# Patient Record
Sex: MALE | Race: WHITE | URBAN - METROPOLITAN AREA
[De-identification: names, ages, dates, MRNs, and addresses within clinical notes are randomized per-mention and may not be internally consistent; named-entity substitution may affect disease eponyms.]

---

## 2017-03-03 ENCOUNTER — TRANSFERRED RECORDS (OUTPATIENT)
Dept: HEALTH INFORMATION MANAGEMENT | Facility: CLINIC | Age: 47
End: 2017-03-03

## 2017-03-21 ENCOUNTER — TRANSFERRED RECORDS (OUTPATIENT)
Dept: HEALTH INFORMATION MANAGEMENT | Facility: CLINIC | Age: 47
End: 2017-03-21

## 2017-04-11 ENCOUNTER — MEDICAL CORRESPONDENCE (OUTPATIENT)
Dept: HEALTH INFORMATION MANAGEMENT | Facility: CLINIC | Age: 47
End: 2017-04-11

## 2017-04-11 ENCOUNTER — TRANSFERRED RECORDS (OUTPATIENT)
Dept: HEALTH INFORMATION MANAGEMENT | Facility: CLINIC | Age: 47
End: 2017-04-11

## 2017-04-19 ENCOUNTER — PRE VISIT (OUTPATIENT)
Dept: NEUROLOGY | Facility: CLINIC | Age: 47
End: 2017-04-19

## 2017-04-19 NOTE — TELEPHONE ENCOUNTER
1.  Date/reason for appt: 5/2/17 at 8:30 AM - Headaches  2.  Referring provider: Dr. Alfred miguel/ [facility]  3.  Call to patient (Yes / No - short description): No, referred  4.  Previous care at:   - [facility]

## 2017-05-01 ASSESSMENT — ENCOUNTER SYMPTOMS
COUGH: 1
WEAKNESS: 0
PARALYSIS: 0
SHORTNESS OF BREATH: 0
EYE PAIN: 1
MYALGIAS: 1
SPUTUM PRODUCTION: 1
TREMORS: 0
MUSCLE CRAMPS: 0
SINUS CONGESTION: 0
DYSPNEA ON EXERTION: 0
RESPIRATORY PAIN: 0
FEVER: 1
SPEECH CHANGE: 0
TINGLING: 0
EYE IRRITATION: 0
CHILLS: 1
NECK MASS: 0
SORE THROAT: 0
JOINT SWELLING: 0
MUSCLE WEAKNESS: 0
DIZZINESS: 0
SEIZURES: 0
HEMOPTYSIS: 0
BACK PAIN: 0
EYE WATERING: 0
LOSS OF CONSCIOUSNESS: 0
EYE REDNESS: 0
NECK PAIN: 0
NUMBNESS: 0
FATIGUE: 1
SMELL DISTURBANCE: 0
STIFFNESS: 1
POSTURAL DYSPNEA: 0
ARTHRALGIAS: 1
SINUS PAIN: 1
HEADACHES: 1
MEMORY LOSS: 0
HOARSE VOICE: 0
SNORES LOUDLY: 0
DISTURBANCES IN COORDINATION: 0
DOUBLE VISION: 0
WHEEZING: 0
TROUBLE SWALLOWING: 0
COUGH DISTURBING SLEEP: 1

## 2017-05-02 ENCOUNTER — OFFICE VISIT (OUTPATIENT)
Dept: NEUROLOGY | Facility: CLINIC | Age: 47
End: 2017-05-02

## 2017-05-02 VITALS — HEART RATE: 81 BPM | HEIGHT: 69 IN | DIASTOLIC BLOOD PRESSURE: 90 MMHG | SYSTOLIC BLOOD PRESSURE: 138 MMHG

## 2017-05-02 DIAGNOSIS — R03.0 ELEVATED BLOOD PRESSURE READING WITHOUT DIAGNOSIS OF HYPERTENSION: ICD-10-CM

## 2017-05-02 DIAGNOSIS — R51.9 FRONTAL HEADACHE: Primary | ICD-10-CM

## 2017-05-02 RX ORDER — PROPRANOLOL HCL 60 MG
60 CAPSULE, EXTENDED RELEASE 24HR ORAL DAILY
Qty: 30 CAPSULE | Refills: 3 | Status: SHIPPED | OUTPATIENT
Start: 2017-05-02 | End: 2017-05-02

## 2017-05-02 RX ORDER — AMITRIPTYLINE HYDROCHLORIDE 10 MG/1
10 TABLET ORAL AT BEDTIME
Qty: 30 TABLET | Refills: 1 | Status: SHIPPED | OUTPATIENT
Start: 2017-05-02

## 2017-05-02 ASSESSMENT — PAIN SCALES - GENERAL: PAINLEVEL: EXTREME PAIN (8)

## 2017-05-02 NOTE — LETTER
5/2/2017       RE: Elvis Perales  [id number] [address] [id number]     Dear Colleague,    Thank you for referring your patient, Elvis Perales, to the [facility] NEUROLOGY at [facility]. Please see a copy of my visit note below.    Re: Elvis Perales      MRN# [id number]  YOB: 1970  Date of Visit:5/2/2017     OUTPATIENT NEUROLOGY VISIT NOTE  Chief Complaint:  Headache evaluation    History of Present Illness  Elvis Perales is a 46-year-old male presents to the clinic today for frontal headache evaluation.   Accompanied by his wife of 27 years.     Headache History:    Onset History: Daily since December of 2016. Patient reports that he was treated by ENT (chronic sinusitis) and rheumatologist ([facility] for the arthritis), took prednisone and azithromycin nothing helped in the long term. Reports that his headaches would be associated with sinus problems. Mother-has chronic sinusitis and headaches associated with sinusitis.      Current Headache Pattern:      Frequency (How many headache days per month?): Daily     Duration of Headache:  Headache improves with ibuprofen or tylenol and reoccur 4-6 hours.    Aura: none   Associated Symptoms:  Chills/cold and fatigue, tired,  Not so much of lightsensitivity     Description of Headache Pain & Location:  Pressure pain between his eyes        Level of Pain during usual headache:  7-8/10      Level of Pain during the worst headache:  7-8/10    Do headaches interfere with or prevent usual activities or diminish your productivity at home or work?  Yes     Treatments Tried:  Advil helps the best or Tylenol daily every 4-6 hours  Have you needed to utilize the Emergency Room to treat your headache symptoms?  If so, how often and when was the last time used:  no  Are Headaches worsening over time?  No    What makes your headaches better?  NSAIDs and Tylenol, sinus medications    What makes your headaches worse  or triggers your headaches? no     Sleeping is Ok for the most part  Coughing in am due to post nasal drip, headache starts after coughing starts    Denies history of head or neck trauma, dizziness, vertigo, loss of consciousness, seizure, double vision, blurred vision, hearing difficulty, speech or swallowing difficulty, weakness or numbness in face, arms or legs, urinary or bowel incontinence, coordination problems or gait difficulty, fever or chills.    Neurodiagnostic Testing  [facility] MEDICAL IMAGING  CT SINUS WO  3/3/2017 10:40 AM    INDICATION: Post-nasal Drainage Chronic Sinusitis, Unspecified Location Persistent Headaches Sinus Pressure  TECHNIQUE: Direct thin section axial CT with coronal and sagittal reconstruction. Dose reduction techniques were used.  COMPARISON: None.    FINDINGS:   POSTOPERATIVE CHANGES: None apparent.    FRONTAL SINUSES:  Clear.    ETHMOID SINUSES:  Clear.    SPHENOID SINUSES: Clear.     MAXILLARY SINUSES: Moderate sized polyp or retention cyst in the right maxillary sinus. Minimal membrane thickening left maxillary sinus. The floors of the maxillary sinuses are intact.    NASAL CAVITY/SKULL BASE: Midline nasal septum without focal defect. Unremarkable nasal turbinates. The cribriform plate is intact and symmetric. The anterior clinoid processes are not pneumatized.    PARANASAL SINUS DRAINAGE PATHWAYS:  The paranasal sinus drainage pathways are widely patent.    NON-SINUS STRUCTURES: No intraorbital mass. The visualized intracranial contents and mastoids are unremarkable. Heterogeneous clivus and sphenoid bone raises the possibility of fibrous dysplasia.    CONCLUSION:  1.  Moderate-sized polyp or retention cysts in the right maxillary sinus.  2.  No evidence for acute or chronic sinusitis.  Lab -CRP, ESR, P-ANCA, C-ANCA,   Presumed normal CBC, ÁNGEL, Lupus, hep C and B per patient was completed by his rheumatologist but no actual report available today for review.  "  Past Medical History reviewed and verified with the patient  Chronic sinusitis  Sore joints associated with sinus problems and headaches  Chews tobacco  Gastric reflux and takes omeprazole.   Rotator cuff tear and surgery    Past Surgical History reviewed and verified with the patient  Hernia repair  Tonsillectomy  Vasectomy  Rotator cuff repair in 2011  right shoulder s/p arthroscopic SAD, AC joint resection, RCR, with mini-open biceps tenodesis 03/09/2011   Cataract bilateral in March of 2016    Family History reviewed and verified with the patient  No neurological problems   reviewed and verified with the patient     Social History     Social History     Marital status:  for 27 years     Spouse name:      Number of children: 2       Occupational History     Work as a  for 18 years     Social History Main Topics     Smoking status: Current Every Day Smoker     Types: Dip, chew, snus or snuff     Alcohol use No to occasionally/rare     Drug use: No     Other Topics Concern     Allergies   Allergen Reactions     Ciprofloxacin      Codeine Hives     Minocycline Hives     Penicillins Hives     Sulfa Drugs Nausea and Vomiting       No current outpatient prescriptions on file.   reviewed and verified with the patient    Review of Systems:  A 12-point ROS including constitutional, eyes, ENT, respiratory, cardiovascular, gastroenterology, genitourinary, integumentary, musculoskeletal, neurology, hematology and psychiatric were all reviewed with the patient and completed at the [facility] Question nary and as mentioned in the HPI.     General Exam:   /90  Pulse 81  Ht 1.753 m (5' 9\")  Manually rechecked /90   GEN: Awake, NAD; good eye contact, responses appropriately   HEENT: Head atraumatic/Normocephalic. Scalp normal. Supratrochlear/supraorbital tenderness, right>left. Pupils equally round, 2 mm, reactive to light and accommodation, sclera and conjunctiva normal. Fundoscopic " examination attempted  Neck: Easily moveable without resistance  Heart: S1/S2 appreciated, RRR, no m/r/g, no carotid bruits  Lungs:Lungs are clear to auscultation bilaterally, no wheezes or crackles.   Neurological Examination:  The patient is alert and oriented times four. Has good attention and concentration. Speech is fluent without dysarthria.   Cranial nerves:  CN I deferred.   CN II: Intact and full visual fields to confrontation bilaterally. CN III, IV, VI: EOM intact. There is no nystagmus. Has conjugated gaze. Intact direct and consensual pupillary light reflexes.   CN V: Intact and symmetrical to facial sensation in the V1 through V3 bilaterally.   CN VII: Intact and symmetrical eyebrow and lid raise and eyelid closure, smiles and frown.   CN VIII: Intact to finger rub bilaterally.   CN IX and X: The palates elevates symmetrical. The uvula is midline.   CN XII: The tongue protrudes midline with no atrophy or fasciculations.   Motor exam: The patient has a normal bulk and tone throughout. There is no atrophy, fasciculations, clonus, or abnormal movements appreciated.   Strength Exam:  5/5 strength at shoulder abduction, elbow flexion or extension, wrist flexion or extension, finger abduction, , hip flexion and extension, knee flexion and extension, and dorsiflexion and plantarflexion bilaterally.   Sensation is intact to light touch and pinprick throughout. Has good vibration and proprioception sensation at great toes bilaterally.   Reflexes are 2/4 and symmetrical at biceps, triceps, brachioradialis, patellar, and Achilles.   Negative Babinski with downgoing toes bilaterally.   Coordination reveals finger-nose-finger with normal speed and accuracy.   Station and gait is normal.     Assessment and Plan:  Frontal Headache. Possible atypical migraine with possible acute analgesics rebound headache overlap or other etiology.  Reports that headache associated with chills, fatigue. Some  supraorbital/trochlear area tenderness on the exam and otherwise non focal exam findings. Outside CRP and ESR normal.   Plan:  Brain MRI for any possible intracranial etiology   A trial of amitriptyline as instructed  Sign PATRIC to get records/blood work from his rheumatologist.   Recommended to wean off or stop ibuprofen and acetaminophen use for 3-4 weeks  Headache log for frequency, severity and possible triggers  Follow up in 4 weeks or sooner if needed       Elevated blood pressure today. Recommended follow up with PCP    Prescription for amitriptyline  provided. Correct use and course provided. Expected benefits and typical side effects reviewed. Safety of concomitant medications and interactions reviewed. Patient taught signs and symptoms of adverse reactions and allergies. Patient understands teaching and accepts risks of prescribed medication regimen.    I discussed all my recommendation with Elvis Perales. The patient verbalizes understanding and comfortable with the plan. The patient has our clinic phone number to call with any questions or concerns. All of the patient's questions were answered from the best of my current knowledge.     Thank you for letting me be a part of the treatment team for Elvis Perales    Time spent with pt answering questions, discussing findings, counseling and coordinating care was more than 50% the appointment time, 56  minutes.     TIFFANY Bolanos, CNP  [facility]

## 2017-05-02 NOTE — PROGRESS NOTES
Re: Elvis Perales      MRN# [id number]  YOB: 1970  Date of Visit:5/2/2017     OUTPATIENT NEUROLOGY VISIT NOTE    Chief Complaint:  Headache evaluation    History of Present Illness  Elvis Perales is a 46-year-old male presents to the clinic today for frontal headache evaluation.   Accompanied by his wife of 27 years.     Headache History:    Onset History: Daily since December of 2016. Patient reports that he was treated by ENT (chronic sinusitis) and rheumatologist ([facility] for the arthritis), took prednisone and azithromycin nothing helped in the long term. Reports that his headaches would be associated with sinus problems. Mother-has chronic sinusitis and headaches associated with sinusitis.      Current Headache Pattern:      Frequency (How many headache days per month?): Daily     Duration of Headache:  Headache improves with ibuprofen or tylenol and reoccur 4-6 hours.      Aura: none   Associated Symptoms:  Chills/cold and fatigue, tired,  Not so much of lightsensitivity     Description of Headache Pain & Location:  Pressure pain between his eyes        Level of Pain during usual headache:  7-8/10      Level of Pain during the worst headache:  7-8/10    Do headaches interfere with or prevent usual activities or diminish your productivity at home or work?  Yes     Treatments Tried:  Advil helps the best or Tylenol daily every 4-6 hours  Have you needed to utilize the Emergency Room to treat your headache symptoms?  If so, how often and when was the last time used:  no  Are Headaches worsening over time?  No    What makes your headaches better?  NSAIDs and Tylenol, sinus medications    What makes your headaches worse or triggers your headaches? no     Sleeping is Ok for the most part  Coughing in am due to post nasal drip, headache starts after coughing starts    Denies history of head or neck trauma, dizziness, vertigo, loss of consciousness, seizure, double vision, blurred vision,  hearing difficulty, speech or swallowing difficulty, weakness or numbness in face, arms or legs, urinary or bowel incontinence, coordination problems or gait difficulty, fever or chills.    Neurodiagnostic Testing  [facility] IMAGING  CT SINUS WO  3/3/2017 10:40 AM    INDICATION: Post-nasal Drainage Chronic Sinusitis, Unspecified Location Persistent Headaches Sinus Pressure  TECHNIQUE: Direct thin section axial CT with coronal and sagittal reconstruction. Dose reduction techniques were used.  COMPARISON: None.    FINDINGS:   POSTOPERATIVE CHANGES: None apparent.    FRONTAL SINUSES:  Clear.    ETHMOID SINUSES:  Clear.    SPHENOID SINUSES: Clear.     MAXILLARY SINUSES: Moderate sized polyp or retention cyst in the right maxillary sinus. Minimal membrane thickening left maxillary sinus. The floors of the maxillary sinuses are intact.    NASAL CAVITY/SKULL BASE: Midline nasal septum without focal defect. Unremarkable nasal turbinates. The cribriform plate is intact and symmetric. The anterior clinoid processes are not pneumatized.    PARANASAL SINUS DRAINAGE PATHWAYS:  The paranasal sinus drainage pathways are widely patent.    NON-SINUS STRUCTURES: No intraorbital mass. The visualized intracranial contents and mastoids are unremarkable. Heterogeneous clivus and sphenoid bone raises the possibility of fibrous dysplasia.    CONCLUSION:  1.  Moderate-sized polyp or retention cysts in the right maxillary sinus.  2.  No evidence for acute or chronic sinusitis.  Lab -CRP, ESR, P-ANCA, C-ANCA,   Presumed normal CBC, ÁNGEL, Lupus, hep C and B per patient was completed by his rheumatologist but no actual report available today for review.   Past Medical History reviewed and verified with the patient  Chronic sinusitis  Sore joints associated with sinus problems and headaches  Chews tobacco  Gastric reflux and takes omeprazole.   Rotator cuff tear and surgery    Past Surgical History reviewed and verified with  "the patient  Hernia repair  Tonsillectomy  Vasectomy  Rotator cuff repair in 2011  right shoulder s/p arthroscopic SAD, AC joint resection, RCR, with mini-open biceps tenodesis 03/09/2011   Cataract bilateral in March of 2016    Family History reviewed and verified with the patient  No neurological problems   reviewed and verified with the patient     Social History     Social History     Marital status:  for 27 years     Spouse name:      Number of children: 2       Occupational History     Work as a  for 18 years     Social History Main Topics     Smoking status: Current Every Day Smoker     Types: Dip, chew, snus or snuff     Alcohol use No to occasionally/rare     Drug use: No     Other Topics Concern         Allergies   Allergen Reactions     Ciprofloxacin      Codeine Hives     Minocycline Hives     Penicillins Hives     Sulfa Drugs Nausea and Vomiting       No current outpatient prescriptions on file.   reviewed and verified with the patient    Review of Systems:  A 12-point ROS including constitutional, eyes, ENT, respiratory, cardiovascular, gastroenterology, genitourinary, integumentary, musculoskeletal, neurology, hematology and psychiatric were all reviewed with the patient and completed at the [facility] Question nary and as mentioned in the HPI.     General Exam:   /90  Pulse 81  Ht 1.753 m (5' 9\")  Manually rechecked /90   GEN: Awake, NAD; good eye contact, responses appropriately   HEENT: Head atraumatic/Normocephalic. Scalp normal. Supratrochlear/supraorbital tenderness, right>left. Pupils equally round, 2 mm, reactive to light and accommodation, sclera and conjunctiva normal. Fundoscopic examination attempted  Neck: Easily moveable without resistance  Heart: S1/S2 appreciated, RRR, no m/r/g, no carotid bruits  Lungs:Lungs are clear to auscultation bilaterally, no wheezes or crackles.   Neurological Examination:  The patient is alert and oriented times " four. Has good attention and concentration. Speech is fluent without dysarthria.   Cranial nerves:  CN I deferred.   CN II: Intact and full visual fields to confrontation bilaterally. CN III, IV, VI: EOM intact. There is no nystagmus. Has conjugated gaze. Intact direct and consensual pupillary light reflexes.   CN V: Intact and symmetrical to facial sensation in the V1 through V3 bilaterally.   CN VII: Intact and symmetrical eyebrow and lid raise and eyelid closure, smiles and frown.   CN VIII: Intact to finger rub bilaterally.   CN IX and X: The palates elevates symmetrical. The uvula is midline.   CN XII: The tongue protrudes midline with no atrophy or fasciculations.   Motor exam: The patient has a normal bulk and tone throughout. There is no atrophy, fasciculations, clonus, or abnormal movements appreciated.   Strength Exam:  5/5 strength at shoulder abduction, elbow flexion or extension, wrist flexion or extension, finger abduction, , hip flexion and extension, knee flexion and extension, and dorsiflexion and plantarflexion bilaterally.   Sensation is intact to light touch and pinprick throughout. Has good vibration and proprioception sensation at great toes bilaterally.   Reflexes are 2/4 and symmetrical at biceps, triceps, brachioradialis, patellar, and Achilles.   Negative Babinski with downgoing toes bilaterally.   Coordination reveals finger-nose-finger with normal speed and accuracy.   Station and gait is normal.     Assessment and Plan:  Frontal Headache. Possible atypical migraine with possible acute analgesics rebound headache overlap or other etiology.  Reports that headache associated with chills, fatigue. Some supraorbital/trochlear area tenderness on the exam and otherwise non focal exam findings. Outside CRP and ESR normal.   Plan:  Brain MRI for any possible intracranial etiology   A trial of amitriptyline as instructed  Sign PATRIC to get records/blood work from his rheumatologist.    Recommended to wean off or stop ibuprofen and acetaminophen use for 3-4 weeks  Headache log for frequency, severity and possible triggers  Follow up in 4 weeks or sooner if needed       Elevated blood pressure today. Recommended follow up with PCP    Prescription for amitriptyline  provided. Correct use and course provided. Expected benefits and typical side effects reviewed. Safety of concomitant medications and interactions reviewed. Patient taught signs and symptoms of adverse reactions and allergies. Patient understands teaching and accepts risks of prescribed medication regimen.    I discussed all my recommendation with Elvis Perales. The patient verbalizes understanding and comfortable with the plan. The patient has our clinic phone number to call with any questions or concerns. All of the patient's questions were answered from the best of my current knowledge.     Thank you for letting me be a part of the treatment team for Elvis Perales    Time spent with pt answering questions, discussing findings, counseling and coordinating care was more than 50% the appointment time, 56  minutes.         TIFFANY Bolanos, CNP  [facility]    Answers for HPI/ROS submitted by the patient on 5/1/2017   General Symptoms: Yes  Skin Symptoms: No  HENT Symptoms: Yes  EYE SYMPTOMS: Yes  HEART SYMPTOMS: No  LUNG SYMPTOMS: Yes  INTESTINAL SYMPTOMS: No  URINARY SYMPTOMS: No  REPRODUCTIVE SYMPTOMS: No  SKELETAL SYMPTOMS: Yes  BLOOD SYMPTOMS: No  NERVOUS SYSTEM SYMPTOMS: Yes  MENTAL HEALTH SYMPTOMS: No  Fever: Yes  Fatigue: Yes  Chills: Yes  Ear pain: No  Ear discharge: No  Hearing loss: No  Tinnitus: No  Nosebleeds: No  Congestion: No  Sinus pain: Yes  Trouble swallowing: No   Voice hoarseness: No  Mouth sores: No  Sore throat: No  Tooth pain: No  Gum tenderness: No  Bleeding gums: No  Change in sense of smell: No  Dry mouth: No  Hearing aid used: No  Neck lump: No  Eye pain: Yes  Vision loss: No  Dry eyes:  No  Watery eyes: No  Eye bulging: No  Double vision: No  Flashing of lights: No  Spots: No  Floaters: Yes  Redness: No  Crossed eyes: No  Tunnel Vision: No  Yellowing of eyes: No  Eye irritation: No  Cough: Yes  Sputum or phlegm: Yes  Coughing up blood: No  Difficulty breating or shortness of breath: No  Snoring: No  Wheezing: No  Difficulty breathing on exertion: No  Respiratory pain: No  Nighttime Cough: Yes  Difficulty breathing when lying flat: No  Back pain: No  Muscle aches: Yes  Neck pain: No  Swollen joints: No  Joint pain: Yes  Bone pain: No  Muscle cramps: No  Muscle weakness: No  Joint stiffness: Yes  Bone fracture: No  Trouble with coordination: No  Dizziness or trouble with balance: No  Fainting or black-out spells: No  Memory loss: No  Headache: Yes  Seizures: No  Speech problems: No  Tingling: No  Tremor: No  Weakness: No  Difficulty walking: No  Paralysis: No  Numbness: No

## 2017-05-02 NOTE — MR AVS SNAPSHOT
After Visit Summary   5/2/2017    Elvis Perales    MRN: [id number]           Patient Information     Date Of Birth          1970        Visit Information        Provider Department      5/2/2017 8:30 AM Maria Elena Perales APRN CNP [facility]        Today's Diagnoses     Frontal headache    -  1      Care Instructions    Plan:  Brain MRI  A trial of amitriptyline  Sign PATRIC to get records/blood work from his rheumatologist.   Recommended to wean off or stop ibuprofen and acetaminophen use for 3-4 weeks  Headache log for frequency, severity and possible triggers  Follow up in 4 weeks or sooner if needed       Elevated blood pressure today. Recommended follow up with PCP          Follow-ups after your visit        Your next 10 appointments already scheduled     May 04, 2017  5:15 PM CDT   MR BRAIN W/O & W CONTRAST with BEMR[facility]   [facility]    [address] [facility] [address] [phone]           Take your medicines as usual, unless your doctor tells you not to. Bring a list of your current medicines to your exam (including vitamins, minerals and over-the-counter drugs).  You will be given intravenous contrast for this exam. To prepare:   The day before your exam, drink extra fluids at least six 8-ounce glasses (unless your doctor tells you to restrict your fluids).   Have a blood test (creatinine test) within 30 days of your exam. Go to your clinic or Diagnostic Imaging Department for this test.  The MRI machine uses a strong magnet. Please wear clothes without metal (snaps, zippers). A sweatsuit works well, or we may give you a hospital gown.  Please remove any body piercings and hair extensions before you arrive. You will also remove watches, jewelry, hairpins, wallets, dentures, partial dental plates and hearing aids. You may wear contact lenses, and you may be able to wear your rings. We have a safe place to keep your  personal items, but it is safer to leave them at home.   **IMPORTANT** THE INSTRUCTIONS BELOW ARE ONLY FOR THOSE PATIENTS WHO HAVE BEEN TOLD THEY WILL RECEIVE SEDATION OR GENERAL ANESTHESIA DURING THEIR MRI PROCEDURE:  IF YOU WILL RECEIVE SEDATION (take medicine to help you relax during your exam):   You must get the medicine from your doctor before you arrive. Bring the medicine to the exam. Do not take it at home.   Arrive one hour early. Bring someone who can take you home after the test. Your medicine will make you sleepy. After the exam, you may not drive, take a bus or take a taxi by yourself.   No eating 8 hours before your exam. You may have clear liquids up until 4 hours before your exam. (Clear liquids include water, clear tea, black coffee and fruit juice without pulp.)  IF YOU WILL RECEIVE ANESTHESIA (be asleep for your exam):   Arrive 1 1/2 hours early. Bring someone who can take you home after the test. You may not drive, take a bus or take a taxi by yourself.   No eating 8 hours before your exam. You may have clear liquids up until 4 hours before your exam. (Clear liquids include water, clear tea, black coffee and fruit juice without pulp.)  Please call the Imaging Department at your exam site with any questions.            Jun 05, 2017  3:30 PM CDT   (Arrive by 3:15 PM)   Return Visit with TIFFANY Yoder [facility] ([facility])    [address] [place] [address] [phone]              Future tests that were ordered for you today     Open Future Orders        Priority Expected Expires Ordered    MR Brain w/o & w Contrast Routine 5/2/2017 8/2/2017 5/2/2017            Who to contact     Please call your clinic at [phone] to:    Ask questions about your health    Make or cancel appointments    Discuss your medicines    Learn about your test results    Speak to your doctor   If you have compliments or concerns  "about an experience at your clinic, or if you wish to file a complaint, please contact [facility] Patient Relations at [phone] or email us at [email or web address]         Additional Information About Your Visit        [employer] Information     [employer] gives you secure access to your electronic health record. If you see a primary care provider, you can also send messages to your care team and make appointments. If you have questions, please call your primary care clinic.  If you do not have a primary care provider, please call [phone] and they will assist you.      [employer] is an electronic gateway that provides easy, online access to your medical records. With [employer], you can request a clinic appointment, read your test results, renew a prescription or communicate with your care team.     To access your existing account, please contact your [facility] Clinic or call [phone] for assistance.        Care EveryWhere ID     This is your Care EveryWhere ID. This could be used by other organizations to access your [facility] medical records  [id number]        Your Vitals Were     Pulse Height                81 1.753 m (5' 9\")           Blood Pressure from Last 3 Encounters:   05/02/17 138/90    Weight from Last 3 Encounters:   No data found for Wt                 Today's Medication Changes          These changes are accurate as of: 5/2/17  9:58 AM.  If you have any questions, ask your nurse or doctor.               Start taking these medicines.        Dose/Directions    amitriptyline 10 MG tablet   Commonly known as:  ELAVIL   Used for:  Frontal headache   Started by:  Maria Elena Perales APRN CNP        Dose:  10 mg   Take 1 tablet (10 mg) by mouth At Bedtime   Quantity:  30 tablet   Refills:  1            Where to get your medicines      These medications were sent to [facility] - [place], [place] - [address]  " [address] [facility] [address]     Phone:  [phone]     amitriptyline 10 MG tablet                Primary Care Provider Office Phone # Fax #    Alfredito Posada [phone]       [facility] [address]  [facility] [address]        Thank you!     Thank you for choosing [facility]  for your care. Our goal is always to provide you with excellent care. Hearing back from our patients is one way we can continue to improve our services. Please take a few minutes to complete the written survey that you may receive in the mail after your visit with us. Thank you!             Your Updated Medication List - Protect others around you: Learn how to safely use, store and throw away your medicines at www.disposemymeds.org.          This list is accurate as of: 5/2/17  9:58 AM.  Always use your most recent med list.                   Brand Name Dispense Instructions for use    amitriptyline 10 MG tablet    ELAVIL    30 tablet    Take 1 tablet (10 mg) by mouth At Bedtime       MULTI VITAMIN MENS PO          omeprazole 20 MG CR capsule    priLOSEC         TYLENOL 167 MG/5ML elixir   Generic drug:  acetaminophen

## 2017-05-02 NOTE — PATIENT INSTRUCTIONS
Plan:  Brain MRI  A trial of amitriptyline  Sign PATRIC to get records/blood work from his rheumatologist.   Recommended to wean off or stop ibuprofen and acetaminophen use for 3-4 weeks  Headache log for frequency, severity and possible triggers  Follow up in 4 weeks or sooner if needed       Elevated blood pressure today. Recommended follow up with PCP

## 2017-05-04 ENCOUNTER — RADIANT APPOINTMENT (OUTPATIENT)
Dept: MRI IMAGING | Facility: CLINIC | Age: 47
End: 2017-05-04
Attending: NURSE PRACTITIONER
Payer: COMMERCIAL

## 2017-05-04 DIAGNOSIS — R51.9 FRONTAL HEADACHE: ICD-10-CM

## 2017-05-04 PROCEDURE — 70553 MRI BRAIN STEM W/O & W/DYE: CPT | Mod: TC

## 2017-05-04 PROCEDURE — A9585 GADOBUTROL INJECTION: HCPCS | Performed by: NURSE PRACTITIONER

## 2017-05-04 RX ORDER — GADOBUTROL 604.72 MG/ML
15 INJECTION INTRAVENOUS ONCE
Status: COMPLETED | OUTPATIENT
Start: 2017-05-04 | End: 2017-05-04

## 2017-05-04 RX ADMIN — GADOBUTROL 15 ML: 604.72 INJECTION INTRAVENOUS at 18:02

## 2017-10-10 DIAGNOSIS — R90.82 WHITE MATTER ABNORMALITY ON MRI OF BRAIN: Primary | ICD-10-CM

## 2017-10-11 ENCOUNTER — CARE COORDINATION (OUTPATIENT)
Dept: NEUROLOGY | Facility: CLINIC | Age: 47
End: 2017-10-11

## 2017-10-11 NOTE — PROGRESS NOTES
Maria Elena Perales APRN CNP McAllister, Angela, RN                   I talk to him a while ago. Its nonspecific but we were focusing on his sinuses. Fall-early winter just to make sure that the reported hyperintensity focus has not changed.   Thank you     Are you at [facility]?            Previous Messages       ----- Message -----      From: Whitney Slade RN      Sent: 10/11/2017  10:15 AM        To: TIFFANY Yoder CNP   Subject: RE: follow up                                     Does he know about his imaging findings? When do you suggest he repeat?     Thanks,   Janice   ----- Message -----      From: Maria Elena Perales APRN CNP      Sent: 10/10/2017   4:15 PM        To: Whitney Slade RN   Subject: follow up                                         Can you please call him and follow up. 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 I will place brain MRI future order. Thank you            10/11/17: Called patient and he is willing to repeat as recommended. He would like our staff to call him to help arrange. Message sent to clinic coordinators to help arrange.

## 2020-03-01 ENCOUNTER — HEALTH MAINTENANCE LETTER (OUTPATIENT)
Age: 50
End: 2020-03-01

## 2020-12-14 ENCOUNTER — HEALTH MAINTENANCE LETTER (OUTPATIENT)
Age: 50
End: 2020-12-14

## 2021-04-17 ENCOUNTER — HEALTH MAINTENANCE LETTER (OUTPATIENT)
Age: 51
End: 2021-04-17

## 2021-10-02 ENCOUNTER — HEALTH MAINTENANCE LETTER (OUTPATIENT)
Age: 51
End: 2021-10-02

## 2022-05-14 ENCOUNTER — HEALTH MAINTENANCE LETTER (OUTPATIENT)
Age: 52
End: 2022-05-14

## 2022-09-03 ENCOUNTER — HEALTH MAINTENANCE LETTER (OUTPATIENT)
Age: 52
End: 2022-09-03

## 2023-06-02 ENCOUNTER — HEALTH MAINTENANCE LETTER (OUTPATIENT)
Age: 53
End: 2023-06-02